# Patient Record
Sex: MALE | Employment: UNEMPLOYED | URBAN - METROPOLITAN AREA
[De-identification: names, ages, dates, MRNs, and addresses within clinical notes are randomized per-mention and may not be internally consistent; named-entity substitution may affect disease eponyms.]

---

## 2017-12-12 ENCOUNTER — HOSPITAL ENCOUNTER (EMERGENCY)
Facility: CLINIC | Age: 32
Discharge: HOME | End: 2017-12-13
Attending: EMERGENCY MEDICINE | Admitting: EMERGENCY MEDICINE
Payer: MEDICAID

## 2017-12-12 DIAGNOSIS — F22 PARANOID DELUSION (H): ICD-10-CM

## 2017-12-12 DIAGNOSIS — F15.10 METHAMPHETAMINE ABUSE (H): ICD-10-CM

## 2017-12-12 PROCEDURE — 99285 EMERGENCY DEPT VISIT HI MDM: CPT | Mod: 25 | Performed by: EMERGENCY MEDICINE

## 2017-12-12 PROCEDURE — 99284 EMERGENCY DEPT VISIT MOD MDM: CPT | Mod: Z6 | Performed by: EMERGENCY MEDICINE

## 2017-12-12 PROCEDURE — 90791 PSYCH DIAGNOSTIC EVALUATION: CPT

## 2017-12-12 PROCEDURE — 25000132 ZZH RX MED GY IP 250 OP 250 PS 637: Performed by: EMERGENCY MEDICINE

## 2017-12-12 RX ORDER — OLANZAPINE 5 MG/1
10 TABLET, ORALLY DISINTEGRATING ORAL ONCE
Status: COMPLETED | OUTPATIENT
Start: 2017-12-12 | End: 2017-12-12

## 2017-12-12 RX ORDER — OLANZAPINE 10 MG/2ML
10 INJECTION, POWDER, FOR SOLUTION INTRAMUSCULAR ONCE
Status: DISCONTINUED | OUTPATIENT
Start: 2017-12-12 | End: 2017-12-13 | Stop reason: HOSPADM

## 2017-12-12 RX ADMIN — OLANZAPINE 10 MG: 5 TABLET, ORALLY DISINTEGRATING ORAL at 18:15

## 2017-12-12 ASSESSMENT — ENCOUNTER SYMPTOMS
AGITATION: 1
NERVOUS/ANXIOUS: 1

## 2017-12-12 NOTE — ED AVS SNAPSHOT
[facility], Emergency Department    [address] [place] [address]    Phone:  [phone]    Fax:  [phone]                                       Dequan Conde   MRN: [id number]    Department:  [facility], Emergency Department   Date of Visit:  12/12/2017           Patient Information     Date Of Birth          1985        Your diagnoses for this visit were:     Paranoid delusion (H)     Methamphetamine abuse        You were seen by Billy Perdomo MD.        Discharge Instructions       Stop using methamphetamine. It is causing you to become paranoid  Get a Rule 25 evaluation for CD treatment  I will provide you with 2 weeks of medication for paranoia but you need to see a specialist to continue this    24 Hour Appointment Hotline       To make an appointment at any [facility] clinic, call [phone] ([phone]). If you don't have a family doctor or clinic, we will help you find one. [facility] clinics are conveniently located to serve the needs of you and your family.             Review of your medicines      START taking        Dose / Directions Last dose taken    OLANZapine 10 MG tablet   Commonly known as:  zyPREXA   Dose:  10 mg   Quantity:  15 tablet        Take 1 tablet (10 mg) by mouth At Bedtime   Refills:  0                Prescriptions were sent or printed at these locations (1 Prescription)                   Other Prescriptions                Printed at Department/Unit printer (1 of 1)         OLANZapine (ZYPREXA) 10 MG tablet                Orders Needing Specimen Collection     Ordered          12/12/17 1935  Drug abuse screen 6 urine (chem dep) - ROUTINE, Pri[facility]: Routine, Needs to be Collected     Scheduled Task Status   12/12/17 1936 Collect Drug abuse screen 6 urine (chem dep) Open   Order Class:  PCU Collect                  Pending Results     No orders found for last 3 day(s).            Pending Culture Results     No orders found for last 3 day(s).        "     Pending Results Instructions     If you had any lab results that were not finalized at the time of your Discharge, you can call the ED Lab Result RN at [phone]. You will be contacted by this team for any positive Lab results or changes in treatment. The nurses are available 7 days a week from 10A to 6:30P.  You can leave a message 24 hours per day and they will return your call.        Thank you for choosing [facility]       Thank you for choosing [facility] for your care. Our goal is always to provide you with excellent care. Hearing back from our patients is one way we can continue to improve our services. Please take a few minutes to complete the written survey that you may receive in the mail after you visit with us. Thank you!        [facility] Information     [facility] lets you send messages to your doctor, view your test results, renew your prescriptions, schedule appointments and more. To sign up, go to [email or web address] . Click on \"Log in\" on the left side of the screen, which will take you to the Welcome page. Then click on \"Sign up Now\" on the right side of the page.     You will be asked to enter the access code listed below, as well as some personal information. Please follow the directions to create your username and password.     Your access code is: [email or web address]  Expires: 3/13/2018 12:50 AM     Your access code will  in 90 days. If you need help or a new code, please call your [facility] clinic or [phone].        Care EveryWhere ID     This is your Care EveryWhere ID. This could be used by other organizations to access your [facility] medical records  [id number]        Equal Access to Services     [facility]: Hadii rosmery claudio Solora, waaxda luqadaha, qaybta kaalkaylee richards. So [facility] [phone].    ATENCIÓN: Si habla español, tiene a vázquez disposición servicios gratuitos de asistencia lingüística. Llame al [phone].    We comply " with applicable federal civil rights laws and [place] laws. We do not discriminate on the basis of race, color, national origin, age, disability, sex, sexual orientation, or gender identity.            After Visit Summary       This is your record. Keep this with you and show to your community pharmacist(s) and doctor(s) at your next visit.

## 2017-12-12 NOTE — ED AVS SNAPSHOT
[facility] [place], Emergency Department    [address]    [facility] [address]    Phone:  [phone]    Fax:  [phone]                                       Dequan Conde   MRN: [id number]    Department:  [facility], Emergency Department   Date of Visit:  12/12/2017           After Visit Summary Signature Page     I have received my discharge instructions, and my questions have been answered. I have discussed any challenges I see with this plan with the nurse or doctor.    ..........................................................................................................................................  Patient/Patient Representative Signature      ..........................................................................................................................................  Patient Representative Print Name and Relationship to Patient    ..................................................               ................................................  Date                                            Time    ..........................................................................................................................................  Reviewed by Signature/Title    ...................................................              ..............................................  Date                                                            Time

## 2017-12-13 VITALS
RESPIRATION RATE: 16 BRPM | WEIGHT: 151.9 LBS | OXYGEN SATURATION: 100 % | HEART RATE: 70 BPM | TEMPERATURE: 98.7 F | DIASTOLIC BLOOD PRESSURE: 52 MMHG | SYSTOLIC BLOOD PRESSURE: 124 MMHG

## 2017-12-13 RX ORDER — OLANZAPINE 10 MG/1
10 TABLET ORAL AT BEDTIME
Qty: 15 TABLET | Refills: 0 | Status: SHIPPED | OUTPATIENT
Start: 2017-12-13

## 2017-12-13 NOTE — ED NOTES
Pt getting agitated and loud, stating he is not crazy, he is going to be killed. Refusing to stay in room, MD aware.
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
## 2017-12-13 NOTE — DISCHARGE INSTRUCTIONS
Stop using methamphetamine. It is causing you to become paranoid  Get a Rule 25 evaluation for CD treatment  I will provide you with 2 weeks of medication for paranoia but you need to see a specialist to continue this

## 2018-02-24 ENCOUNTER — TRANSFERRED RECORDS (OUTPATIENT)
Dept: HEALTH INFORMATION MANAGEMENT | Facility: CLINIC | Age: 33
End: 2018-02-24

## 2018-02-25 ENCOUNTER — TRANSFERRED RECORDS (OUTPATIENT)
Dept: HEALTH INFORMATION MANAGEMENT | Facility: CLINIC | Age: 33
End: 2018-02-25

## 2018-02-26 ENCOUNTER — TRANSFERRED RECORDS (OUTPATIENT)
Dept: HEALTH INFORMATION MANAGEMENT | Facility: CLINIC | Age: 33
End: 2018-02-26

## 2018-02-27 ENCOUNTER — TRANSFERRED RECORDS (OUTPATIENT)
Dept: HEALTH INFORMATION MANAGEMENT | Facility: CLINIC | Age: 33
End: 2018-02-27

## 2018-02-28 ENCOUNTER — TRANSFERRED RECORDS (OUTPATIENT)
Dept: HEALTH INFORMATION MANAGEMENT | Facility: CLINIC | Age: 33
End: 2018-02-28

## 2019-01-31 ENCOUNTER — TRANSFERRED RECORDS (OUTPATIENT)
Dept: HEALTH INFORMATION MANAGEMENT | Facility: CLINIC | Age: 34
End: 2019-01-31

## 2019-02-05 ENCOUNTER — TELEPHONE (OUTPATIENT)
Dept: UROLOGY | Facility: CLINIC | Age: 34
End: 2019-02-05

## 2019-02-05 NOTE — TELEPHONE ENCOUNTER
I was unable to reach patient to schedule appt with Dr Finney as requested by Blanca helm. There are no demographics, insurance, etc. Voicemail is not set up on phone number provided. I [place] [facility] [place] obtain needed information & was told that they are not able to provide any demographics or insurance information. Javier sent to Blanca REDMAN with this info.

## 2019-02-07 ENCOUNTER — PRE VISIT (OUTPATIENT)
Dept: UROLOGY | Facility: CLINIC | Age: 34
End: 2019-02-07

## 2019-02-07 NOTE — TELEPHONE ENCOUNTER
MEDICAL RECORDS REQUEST   [facility]  Urology Clinic  [address] [place] [address]  PHONE: [phone]  Fax: [phone]        FUTURE VISIT INFORMATION                                                   Dequan Conde : 1985 scheduled for future visit at [facility]    APPOINTMENT INFORMATION:    Date: 2019    Provider:  PAZ BARTON    Reason for Visit/Diagnosis: TESTICULAR CANCER    REFERRAL INFORMATION:    Referring provider:  MAGDALENA DISLA    Specialty: MD    Referring providers clinic:  [facility]    Clinic contact number:  [phone]    RECORDS REQUESTED FOR VISIT                                                     NOTES  STATUS/DETAILS   OFFICE NOTE from referring provider  yes   IN CARE EVERYWHERE   OFFICE NOTE from other specialist  yes   DISCHARGE SUMMARY from hospital  no   DISCHARGE REPORT from the ER  no   OPERATIVE REPORT  in process   MEDICATION LIST  yes       PRE-VISIT CHECKLIST      Record collection complete Yes  CARE EVERY IMAGING IN PACS   Appointment appropriately scheduled           (right time/right provider) Yes   MyChart activation If no, please explain IN PROCESS   Questionnaire complete If no, please explain IN PROCESS     Completed by: Amanda Pa

## 2019-02-08 ENCOUNTER — TELEPHONE (OUTPATIENT)
Dept: ONCOLOGY | Facility: CLINIC | Age: 34
End: 2019-02-08

## 2019-02-08 NOTE — TELEPHONE ENCOUNTER
RECORDS STATUS - ALL OTHER DIAGNOSIS      RECORDS RECEIVED FROM: [facility], ALICE, Sancho   DATE RECEIVED:    NOTES STATUS DETAILS   OFFICE NOTE from referring provider  Epic   OFFICE NOTE from medical oncologist  Epic   DISCHARGE SUMMARY from hospital  Epic   DISCHARGE REPORT from the ER     OPERATIVE REPORT  CE   MEDICATION LIST  /[facility]   CLINICAL TRIAL TREATMENTS TO DATE     LABS     PATHOLOGY REPORTS 11/7/18 2/27/18 (Sancho, Report in CE, COMPLETE) [facility] (Report in CE) Requested.    ANYTHING RELATED TO DIAGNOSIS  Epic   GENONOMIC TESTING     TYPE:     IMAGING (NEED IMAGES & REPORT)     CT SCANS 1/30/19, 12/22/18, 11/6/18, 11/3/18, 2/26/18, 1/14/18 PACS   MRI 11/3/18 PACS   MAMMO NA    ULTRASOUND 12/22/18, 11/3/18, 2/25/18 PACS   PET 2/7/19 PACS in CE) Requested   X Ray - 11/25/18, 11/23/18, 11/9/18, 11/2/18, 9/25/18 PACS

## 2019-02-08 NOTE — TELEPHONE ENCOUNTER
ONCOLOGY INTAKE: Records Information      APPT INFORMATION:  Referring provider:  Dr Heather Wilhelm  Referring provider s clinic:  [facility]  Reason for visit/diagnosis:  Testicualr cancer    Were the records received with the referral (via [employer])? Yes    Has patient been seen for any external appt for this diagnosis (enter clinic/location)? [facility]

## 2019-02-11 ENCOUNTER — PRE VISIT (OUTPATIENT)
Dept: ONCOLOGY | Facility: CLINIC | Age: 34
End: 2019-02-11

## 2019-02-11 PROCEDURE — 00000346 ZZHCL STATISTIC REVIEW OUTSIDE SLIDES TC 88321: Performed by: INTERNAL MEDICINE

## 2019-02-12 LAB — COPATH REPORT: NORMAL

## 2019-02-12 PROCEDURE — 00000346 ZZHCL STATISTIC REVIEW OUTSIDE SLIDES TC 88321: Performed by: INTERNAL MEDICINE

## 2019-02-12 NOTE — TELEPHONE ENCOUNTER
Path slides received from [facility] & sent to [facility] path dept for review.    Case #[id number] (13 slides, 1 report)

## 2019-02-12 NOTE — TELEPHONE ENCOUNTER
Slides are ready at [facility]. [employer]  sent for  on 2/12 at 9:30AM under order number [id number].

## 2019-02-13 LAB — COPATH REPORT: NORMAL

## 2019-02-27 ENCOUNTER — TRANSFERRED RECORDS (OUTPATIENT)
Dept: HEALTH INFORMATION MANAGEMENT | Facility: CLINIC | Age: 34
End: 2019-02-27

## 2019-02-27 ENCOUNTER — TELEPHONE (OUTPATIENT)
Dept: UROLOGY | Facility: CLINIC | Age: 34
End: 2019-02-27

## 2019-02-27 NOTE — TELEPHONE ENCOUNTER
ONCOLOGY INTAKE: Records Information      APPT INFORMATION: 04/11 W/Dr. Finney at [facility] just switched providers records or complete  Referring provider:  Dr Wilhelm referring from [facility]  Referring provider s clinic:  [facility]  Reason for visit/diagnosis:  testicular cancer    Were the records received with the referral (via [employer])? Complete    Has patient been seen for any external appt for this diagnosis (enter clinic/location)? Yes    ADDITIONAL INFORMATION:  NA

## 2019-02-28 ENCOUNTER — TELEPHONE (OUTPATIENT)
Dept: ONCOLOGY | Facility: CLINIC | Age: 34
End: 2019-02-28

## 2019-02-28 NOTE — TELEPHONE ENCOUNTER
TC to [place] [facility]. Message was taken by  staff and passed along to clinic nurse regarding reviewing pt's case and reasons for sending pt to [facility] as pt appears to be unable to keep appts here.     TC from Dorys, RN at [facility] who stated that pt lives at [facility] (homeless shelter) [place] the [place] from [facility], and that is the main reason pt is able to keep appts with [facility].     The reason the referral has been made to [facility] and [facility] is that pt's disease has progressed to the point that he will need major urologic surgery and ongoing medical oncology treatment.     Yady stated she will follow up with Dr. Wilhelm to discuss limited options at the  based on pt no-showing twice with urology and their inability to get pt in until 04/11/2019. Dorys stated she will pass this along to Dr. Wilhelm. Provided Dorys with [facility]'s provider to provider line to give to Dr. Wilhelm in case she would like to reach out to Dr. Finney directly to discuss pt's case. Dorys stated she will call writer back with Dr. Wilhelm's input. Await response.

## 2019-03-07 NOTE — TELEPHONE ENCOUNTER
Dr. Wilhelm from [facility] contacting triage department with questions regarding pt's upcoming visit with Dr. Richards. Per Dr. Wilhelm, patient is reporting he has an upcoming visit scheduled Monday with Dr. Richards.     Dr. Wilhelm reports she spoke to Dr. Richards in the past regarding rescheduling visit, but does not see anything scheduled currently.     Per conversation with Dr. Richards and Dr. Wilhelm, will add patient to Monday 3/11 at 0800 (per Dr. Richards). If patient no-shows appointment with Dr. Richards, Dr. Wilhelm will plan to continue treatment for 2 more cycles as previously discussed with Dr. Richards. Message routed to scheduling department to add pt. Encounter routed to [facility].     Mary Jean Baptiste RN   [facility]

## 2019-03-27 ENCOUNTER — PRE VISIT (OUTPATIENT)
Dept: UROLOGY | Facility: CLINIC | Age: 34
End: 2019-03-27

## 2019-03-27 NOTE — TELEPHONE ENCOUNTER
Chief Complaint : Testicular cancer      New Hx/Sx:     Records/Orders/Proced: Referring [facility] care everywhere     Pt Contacted: no    At Rooming: normal

## 2019-04-13 NOTE — TELEPHONE ENCOUNTER
----- Message from Blanca Barragan, IVAN sent at 2/28/2019  9:48 AM CST -----  Regarding: RE: Susie pt - needs to see urology asap or 03/04  He has no showed us twice now, we really have no where to put him.  Is he not seeing oncology now?  ----- Message -----  From: Autumn Lebron RN  Sent: 2/27/2019   1:41 PM  To: Blanca To RN  Subject: RE: Susie pt - needs to see urology asap or #    Blanca - would you please speak with Dr. Finney about getting this pt in? Let me know if you find out anything. Thank you!    -Autumn    ----- Message -----  From: Jennifer Granda  Sent: 2/27/2019   1:38 PM  To: Autumn To RN, #  Subject: RE: Susie pt - needs to see urology asap or #    Called Dr finney's office. Nothing until 04/11/19.  Also, he was placed on the wait list.    Jennifer  ----- Message -----  From: Autumn Lebron RN  Sent: 2/27/2019   9:38 AM  To: Blanca To RN  Subject: Susie pt - needs to see urology asap or 03/04    Hello!    Dequan has been rescheduled a couple of times for various reasons with Dr. Richrads. He is now on for 03/04 with Dr. Richards. He also was scheduled to be seen by Dr. Finney on the same day, but that had to be changed as well.     Can we get him in with Dr. Finney (or another urologist) on Monday, 03/04? The referring PCP called for an update and is hoping he doesn't get delayed any further. Please let me know what we can do. Thanks!    -Autumn         EMERGENCY DEPARTMENT HISTORY AND PHYSICAL EXAM 
 
 
Date: 4/12/2019 Patient Name: Lit Huynh History of Presenting Illness Chief Complaint Patient presents with  Wheezing History Provided By: Patient HPI: Lit Huynh, 22 y.o. female with PMHx significant for seasonal allergies and seasonal asthma, presents ambulatory to the ED with cc of acute mild wheezing, sob, chest tightness, productive cough x 3 days. +rhinorrhea, nasl congestion, sneezing. Benadryl OTC allergy medications w/o relief. No inhaler/albuterol Rx. Denies fever, chills, headache, lightheadedness, dizziness, CP, hemoptysis, sore throat, n/v, abd pain. There are no other complaints, changes, or physical findings at this time. PCP: None No current facility-administered medications on file prior to encounter. No current outpatient medications on file prior to encounter. Past History Past Medical History: 
Past Medical History:  
Diagnosis Date  Asthma Past Surgical History: 
History reviewed. No pertinent surgical history. Family History: 
History reviewed. No pertinent family history. Social History: 
Social History Tobacco Use  Smoking status: Never Smoker  Smokeless tobacco: Never Used Substance Use Topics  Alcohol use: Yes  Drug use: Never Allergies: Allergies Allergen Reactions  Other Plant, Animal, Environmental Cough and Sneezing Allergic Pollen and dust  
 
 
 
Review of Systems Review of Systems Constitutional: Negative for activity change, chills, fatigue and fever. HENT: Positive for congestion, rhinorrhea and sneezing. Negative for ear discharge, ear pain, postnasal drip, sinus pressure, sinus pain, sore throat and trouble swallowing. Eyes: Negative for pain and redness. Respiratory: Positive for cough, chest tightness, shortness of breath and wheezing. Negative for stridor. Cardiovascular: Negative for chest pain, palpitations and leg swelling. Gastrointestinal: Negative for abdominal pain, diarrhea, nausea and vomiting. Genitourinary: Negative. Musculoskeletal: Negative for arthralgias and myalgias. Skin: Negative. Negative for rash. Allergic/Immunologic: Positive for environmental allergies. Neurological: Negative for headaches. Psychiatric/Behavioral: Negative. Physical Exam  
Physical Exam  
Constitutional: She is oriented to person, place, and time. She appears well-developed and well-nourished. No distress. HENT:  
Head: Normocephalic and atraumatic. Right Ear: Hearing, tympanic membrane, external ear and ear canal normal.  
Left Ear: Hearing, tympanic membrane, external ear and ear canal normal.  
Nose: Mucosal edema and rhinorrhea present. Right sinus exhibits no maxillary sinus tenderness and no frontal sinus tenderness. Left sinus exhibits no maxillary sinus tenderness and no frontal sinus tenderness. Mouth/Throat: Uvula is midline, oropharynx is clear and moist and mucous membranes are normal. Mucous membranes are not dry. No oropharyngeal exudate, posterior oropharyngeal edema, posterior oropharyngeal erythema or tonsillar abscesses. Eyes: Pupils are equal, round, and reactive to light. Conjunctivae and EOM are normal.  
Neck: Normal range of motion. Cardiovascular: Normal rate, regular rhythm, normal heart sounds and intact distal pulses. Exam reveals no gallop and no friction rub. No murmur heard. Pulmonary/Chest: Effort normal. No accessory muscle usage. No respiratory distress. She has no decreased breath sounds. She has wheezes (faint end inspiratory and expiratory. ). She has no rhonchi. She has no rales. She exhibits no tenderness. Speaking in full clear sentences in NAD. Musculoskeletal: Normal range of motion. Neurological: She is alert and oriented to person, place, and time. Skin: Skin is warm and dry. She is not diaphoretic. Psychiatric: She has a normal mood and affect. Her behavior is normal. Judgment and thought content normal.  
Nursing note and vitals reviewed. Diagnostic Study Results Labs - No results found for this or any previous visit (from the past 12 hour(s)). Radiologic Studies - No orders to display CT Results  (Last 48 hours) None CXR Results  (Last 48 hours) None Medical Decision Making I am the first provider for this patient. I reviewed the vital signs, available nursing notes, past medical history, past surgical history, family history and social history. Vital Signs-Reviewed the patient's vital signs. Patient Vitals for the past 12 hrs: 
 Temp Pulse Resp BP SpO2  
04/12/19 2019 98.8 °F (37.1 °C) 81 20 (!) 138/98 99 % Pulse Oximetry Analysis - 99% on RA Records Reviewed: Nursing Notes, Old Medical Records, Previous Radiology Studies and Previous Laboratory Studies Provider Notes (Medical Decision Making):  
Patient presents with SOB and wheezing. DDx: asthma, copd, pulmonary edema, acute bronchitis, ACS, pna. Benign exam w/ stable vitals. Will treat with nebs prn in ED. Consider labs and imaging prn. No further workup indicated after initial exam. 
 
The patient has been re-examined after nebulizer treatments and states that they are feeling better and have no new complaints. On auscultation, wheezing is significantly improved. Laboratory tests, medications, x-rays, diagnosis, follow up plan and return instructions have been reviewed and discussed with the patient. The patient has had the opportunity to ask questions about their care. The patient expresses understanding and agreement with diagnosis, care plan; including oral steroids, nebulizer or inhaler use, follow up and return instructions.   The patient agrees to return in 24 hours if their symptoms are not improving or immediately if they have any change in their condition including worsening wheezing or any signs of increasing work of breathing. ED Course:  
Initial assessment performed. The patients presenting problems have been discussed, and they are in agreement with the care plan formulated and outlined with them. I have encouraged them to ask questions as they arise throughout their visit. ED Course as of Apr 12 2156 Fri Apr 12, 2019  
2153 Pt resting comfortably in room in NAD. No new symptoms or complaints at this time. Endorses she is feeling much better and breathing trouble has resolved since treatment. Available labs/ results reviewed with pt. 
  
 [SM] ED Course User Index [SM] Josué Juarez PA-C Critical Care Time:  
0 Disposition: 
9:56 PM 
I have discussed with patient their diagnosis, treatment, and follow up plan. The patient agrees to follow up as outlined in discharge paperwork and also to return to the ED with any worsening. Equilla Cockayne, PA-C 
 
PLAN: 
1. Current Discharge Medication List  
  
START taking these medications Details  
loratadine-pseudoephedrine (CLARITIN-D 12 HOUR) 5-120 mg per tablet Take 1 Tab by mouth two (2) times a day. Qty: 30 Tab, Refills: 0  
  
fluticasone propionate (FLONASE) 50 mcg/actuation nasal spray 2 Sprays by Both Nostrils route daily. Qty: 1 Bottle, Refills: 0  
  
albuterol (PROVENTIL HFA, VENTOLIN HFA, PROAIR HFA) 90 mcg/actuation inhaler Take 1-2 Puffs by inhalation every four (4) hours as needed for Wheezing. Qty: 1 Inhaler, Refills: 1 2. Follow-up Information Follow up With Specialties Details Why Contact Info [facility]  Schedule an appointment as soon as possible for a visit in 3 days As needed, If symptoms worsen [id number]   [phone] Return to ED if worse Diagnosis Clinical Impression: 1. Asthma due to seasonal allergies Attestations: